# Patient Record
Sex: FEMALE | Race: WHITE | NOT HISPANIC OR LATINO | Employment: UNEMPLOYED | URBAN - METROPOLITAN AREA
[De-identification: names, ages, dates, MRNs, and addresses within clinical notes are randomized per-mention and may not be internally consistent; named-entity substitution may affect disease eponyms.]

---

## 2023-03-02 LAB
FLU A RESULT: NOT DETECTED
FLU B RESULT: NOT DETECTED
SARS-COV-2 RESULT: NOT DETECTED

## 2023-03-20 DIAGNOSIS — L22 DIAPER RASH: ICD-10-CM

## 2023-03-20 DIAGNOSIS — J45.40 MODERATE PERSISTENT ASTHMA WITHOUT COMPLICATION (HHS-HCC): Primary | ICD-10-CM

## 2023-03-20 PROBLEM — K21.9 GERD (GASTROESOPHAGEAL REFLUX DISEASE): Status: ACTIVE | Noted: 2023-03-20

## 2023-03-20 PROBLEM — R45.4 IRRITABLE: Status: ACTIVE | Noted: 2023-03-20

## 2023-03-20 PROBLEM — R32 INCONTINENCE: Status: ACTIVE | Noted: 2023-03-20

## 2023-03-20 PROBLEM — D84.9 IMMUNODEFICIENCY DISORDER (MULTI): Status: ACTIVE | Noted: 2023-03-20

## 2023-03-20 PROBLEM — K59.1 FUNCTIONAL DIARRHEA: Status: ACTIVE | Noted: 2023-03-20

## 2023-03-20 PROBLEM — J45.901 EXTRINSIC ASTHMA, WITH ACUTE EXACERBATION (HHS-HCC): Status: ACTIVE | Noted: 2023-03-20

## 2023-03-20 PROBLEM — J30.9 ALLERGIC RHINITIS: Status: ACTIVE | Noted: 2023-03-20

## 2023-03-20 PROBLEM — K59.00 CONSTIPATION, ACUTE: Status: ACTIVE | Noted: 2023-03-20

## 2023-03-20 PROBLEM — E16.2 HYPOGLYCEMIA: Status: ACTIVE | Noted: 2023-03-20

## 2023-03-20 PROBLEM — R23.3 PETECHIAE: Status: ACTIVE | Noted: 2023-03-20

## 2023-03-20 PROBLEM — F41.1 ANXIETY, GENERALIZED: Status: ACTIVE | Noted: 2023-03-20

## 2023-03-20 PROBLEM — Z93.1 G TUBE FEEDINGS (MULTI): Status: ACTIVE | Noted: 2023-03-20

## 2023-03-20 PROBLEM — N91.2 AMENORRHEA: Status: ACTIVE | Noted: 2023-03-20

## 2023-03-20 PROBLEM — Q68.8 CONGENITAL GENERALIZED FLEXION CONTRACTURES OF LOWER LIMB JOINTS: Status: ACTIVE | Noted: 2023-03-20

## 2023-03-20 PROBLEM — N39.0 URINARY TRACT INFECTION: Status: ACTIVE | Noted: 2023-03-20

## 2023-03-20 PROBLEM — H02.409 PTOSIS: Status: ACTIVE | Noted: 2023-03-20

## 2023-03-20 PROBLEM — Q87.19 CORNELIA DE LANGE SYNDROME (HHS-HCC): Status: ACTIVE | Noted: 2023-03-20

## 2023-03-20 PROBLEM — R14.0 ABDOMINAL BLOATING: Status: ACTIVE | Noted: 2023-03-20

## 2023-03-20 RX ORDER — SODIUM CHLORIDE FOR INHALATION 0.9 %
VIAL, NEBULIZER (ML) INHALATION
COMMUNITY
Start: 2017-02-07 | End: 2023-03-27 | Stop reason: SDUPTHER

## 2023-03-20 RX ORDER — DOCUSATE SODIUM 100 MG
CAPSULE ORAL 2 TIMES DAILY
COMMUNITY
Start: 2021-03-30

## 2023-03-20 RX ORDER — FLUTICASONE PROPIONATE 50 MCG
1 SPRAY, SUSPENSION (ML) NASAL DAILY
COMMUNITY
End: 2023-03-20 | Stop reason: SDUPTHER

## 2023-03-20 RX ORDER — ESOMEPRAZOLE MAGNESIUM 40 MG/1
GRANULE, DELAYED RELEASE ORAL
COMMUNITY
Start: 2022-05-31

## 2023-03-20 RX ORDER — POLYETHYLENE GLYCOL 3350 17 G/17G
POWDER, FOR SOLUTION ORAL
COMMUNITY
Start: 2020-06-18

## 2023-03-20 RX ORDER — NITROFURANTOIN MACROCRYSTALS 25 MG/1
CAPSULE ORAL
COMMUNITY
Start: 2022-06-30

## 2023-03-20 RX ORDER — TRIPROLIDINE/PSEUDOEPHEDRINE 2.5MG-60MG
TABLET ORAL EVERY 8 HOURS
COMMUNITY
Start: 2015-02-15

## 2023-03-20 RX ORDER — IPRATROPIUM BROMIDE 0.5 MG/2.5ML
SOLUTION RESPIRATORY (INHALATION) EVERY 8 HOURS
COMMUNITY
Start: 2021-01-04

## 2023-03-20 RX ORDER — DEXTROMETHORPHAN/PSEUDOEPHED 2.5-7.5/.8
DROPS ORAL
COMMUNITY
Start: 2019-11-25 | End: 2023-07-10 | Stop reason: SDUPTHER

## 2023-03-20 RX ORDER — ALBUTEROL SULFATE 0.83 MG/ML
SOLUTION RESPIRATORY (INHALATION)
COMMUNITY
Start: 2015-05-18 | End: 2024-02-02 | Stop reason: SDUPTHER

## 2023-03-20 RX ORDER — MUPIROCIN 20 MG/G
OINTMENT TOPICAL 3 TIMES DAILY
COMMUNITY
Start: 2018-10-02 | End: 2023-03-23 | Stop reason: SDUPTHER

## 2023-03-20 RX ORDER — IPRATROPIUM BROMIDE 21 UG/1
SPRAY, METERED NASAL
COMMUNITY
Start: 2017-11-14 | End: 2023-10-03 | Stop reason: SDUPTHER

## 2023-03-20 RX ORDER — LACTOBACILLUS RHAMNOSUS GG 10B CELL
CAPSULE ORAL
COMMUNITY

## 2023-03-20 RX ORDER — ALBUTEROL SULFATE 90 UG/1
AEROSOL, METERED RESPIRATORY (INHALATION)
COMMUNITY
Start: 2021-04-13

## 2023-03-20 RX ORDER — CHOLESTYRAMINE 4 G/9G
POWDER, FOR SUSPENSION ORAL
COMMUNITY
Start: 2017-01-20

## 2023-03-20 RX ORDER — SERTRALINE HYDROCHLORIDE 20 MG/ML
SOLUTION ORAL
COMMUNITY
End: 2023-05-04 | Stop reason: SDUPTHER

## 2023-03-21 RX ORDER — FLUTICASONE PROPIONATE 50 MCG
1 SPRAY, SUSPENSION (ML) NASAL DAILY
Qty: 16 G | Refills: 3 | Status: SHIPPED | OUTPATIENT
Start: 2023-03-21 | End: 2023-12-08 | Stop reason: SDUPTHER

## 2023-03-23 RX ORDER — MUPIROCIN 20 MG/G
OINTMENT TOPICAL 3 TIMES DAILY
Qty: 70 G | Refills: 11 | Status: SHIPPED | OUTPATIENT
Start: 2023-03-23 | End: 2023-06-21

## 2023-03-23 RX ORDER — NYSTATIN 100000 U/G
CREAM TOPICAL
COMMUNITY
Start: 2023-03-06 | End: 2023-03-23 | Stop reason: SDUPTHER

## 2023-03-23 RX ORDER — NYSTATIN 100000 U/G
CREAM TOPICAL 3 TIMES DAILY PRN
Qty: 90 G | Refills: 3 | Status: SHIPPED | OUTPATIENT
Start: 2023-03-23 | End: 2023-06-21

## 2023-03-27 DIAGNOSIS — Q30.0 CHOANAL ATRESIA: Primary | ICD-10-CM

## 2023-03-27 RX ORDER — SODIUM CHLORIDE FOR INHALATION 0.9 %
3 VIAL, NEBULIZER (ML) INHALATION AS NEEDED
Qty: 90 ML | Refills: 1 | Status: SHIPPED | OUTPATIENT
Start: 2023-03-27 | End: 2023-06-25

## 2023-05-04 DIAGNOSIS — F41.9 ANXIETY: Primary | ICD-10-CM

## 2023-05-04 RX ORDER — SERTRALINE HYDROCHLORIDE 20 MG/ML
SOLUTION ORAL
Qty: 90 ML | Refills: 0 | Status: SHIPPED | OUTPATIENT
Start: 2023-05-04 | End: 2023-07-18 | Stop reason: SDUPTHER

## 2023-07-07 ENCOUNTER — TELEPHONE (OUTPATIENT)
Dept: PEDIATRICS | Facility: CLINIC | Age: 23
End: 2023-07-07
Payer: COMMERCIAL

## 2023-07-07 NOTE — TELEPHONE ENCOUNTER
Patient's mom is requesting a refill on Simethicone to be sent to [facility] on [address] In [place]. She is aware you are out of office until Monday and is okay to wait.

## 2023-07-10 DIAGNOSIS — R14.0 GASSINESS: Primary | ICD-10-CM

## 2023-07-10 DIAGNOSIS — R14.0 GASSINESS: ICD-10-CM

## 2023-07-10 RX ORDER — DEXTROMETHORPHAN/PSEUDOEPHED 2.5-7.5/.8
40 DROPS ORAL 4 TIMES DAILY PRN
Qty: 30 ML | Refills: 11 | Status: SHIPPED | OUTPATIENT
Start: 2023-07-10 | End: 2023-07-10 | Stop reason: SDUPTHER

## 2023-07-10 RX ORDER — DEXTROMETHORPHAN/PSEUDOEPHED 2.5-7.5/.8
DROPS ORAL
Qty: 720 ML | Refills: 2 | Status: SHIPPED | OUTPATIENT
Start: 2023-07-10

## 2023-07-11 ENCOUNTER — OFFICE VISIT (OUTPATIENT)
Dept: PEDIATRICS | Facility: CLINIC | Age: 23
End: 2023-07-11
Payer: COMMERCIAL

## 2023-07-11 ENCOUNTER — TELEPHONE (OUTPATIENT)
Dept: PEDIATRICS | Facility: CLINIC | Age: 23
End: 2023-07-11

## 2023-07-11 VITALS — BODY MASS INDEX: 16.7 KG/M2 | WEIGHT: 38 LBS

## 2023-07-11 DIAGNOSIS — R26.89 NOT BEARING WEIGHT ON LOWER EXTREMITY: Primary | ICD-10-CM

## 2023-07-11 PROBLEM — Z93.1 GASTROSTOMY PRESENT (MULTI): Status: ACTIVE | Noted: 2018-10-17

## 2023-07-11 PROBLEM — E30.0 DELAY IN SEXUAL DEVELOPMENT AND PUBERTY: Status: ACTIVE | Noted: 2022-12-06

## 2023-07-11 PROBLEM — F79 INTELLECTUAL DISABILITY: Chronic | Status: ACTIVE | Noted: 2017-10-31

## 2023-07-11 PROBLEM — J45.50 SEVERE PERSISTENT ASTHMA WITHOUT COMPLICATION (MULTI): Status: ACTIVE | Noted: 2017-06-29

## 2023-07-11 PROBLEM — Z86.14 PERSONAL HISTORY OF METHICILLIN RESISTANT STAPHYLOCOCCUS AUREUS INFECTION: Status: ACTIVE | Noted: 2020-11-26

## 2023-07-11 PROBLEM — E27.49 IATROGENIC ADRENAL INSUFFICIENCY (MULTI): Status: ACTIVE | Noted: 2022-12-06

## 2023-07-11 PROBLEM — M91.90: Status: ACTIVE | Noted: 2020-11-26

## 2023-07-11 PROBLEM — R06.89 AIRWAY CLEARANCE IMPAIRMENT: Chronic | Status: ACTIVE | Noted: 2018-06-26

## 2023-07-11 PROBLEM — E22.1 HYPERPROLACTINEMIA (MULTI): Status: ACTIVE | Noted: 2022-12-06

## 2023-07-11 PROBLEM — Q30.0 CONGENITAL CHOANAL ATRESIA: Status: ACTIVE | Noted: 2017-10-31

## 2023-07-11 PROBLEM — D73.4 SPLENIC CYST: Status: RESOLVED | Noted: 2021-05-13 | Resolved: 2023-07-11

## 2023-07-11 PROBLEM — R13.12 DYSPHAGIA, OROPHARYNGEAL: Chronic | Status: ACTIVE | Noted: 2017-10-31

## 2023-07-11 PROBLEM — K21.9 GASTROESOPHAGEAL REFLUX DISEASE: Status: ACTIVE | Noted: 2017-10-31

## 2023-07-11 PROBLEM — E23.7: Status: ACTIVE | Noted: 2022-12-06

## 2023-07-11 PROBLEM — N13.70 VUR (VESICOURETERIC REFLUX): Chronic | Status: ACTIVE | Noted: 2017-10-31

## 2023-07-11 PROBLEM — K02.9 DENTAL CARIES: Status: ACTIVE | Noted: 2017-09-22

## 2023-07-11 PROBLEM — Z98.890 STATUS POST CREATION OF PERICARDIAL WINDOW: Status: RESOLVED | Noted: 2021-03-16 | Resolved: 2023-07-11

## 2023-07-11 PROCEDURE — 99213 OFFICE O/P EST LOW 20 MIN: CPT | Performed by: PEDIATRICS

## 2023-07-11 NOTE — PATIENT INSTRUCTIONS
Assessment/Plan   Diagnoses and all orders for this visit:  Not bearing weight on lower extremity  -     XR femur right 2+ views; Future  -     XR tibia fibula right 2 views; Future  Other orders  -     XR pelvis 1-2 views   RED yeast Rice

## 2023-07-11 NOTE — PROGRESS NOTES
Subjective   Patient ID: Audra Appiah is a 22 y.o. female who presents for Foot Injury.  Foot Injury       Audra is here today with mom and aide.  Since yesterday she has been refusing weightbearing.  This seems to be on her right leg.  She had numerous different things going on this weekend.  But she started nonweightbearing yesterday.  Review of Systems   All other systems reviewed and are negative.      Objective   .vitals    Physical Exam  Physical done by mom  FROM of right leg no tenderness to palpation or manipulation.  Abd :soft ? Tender over left pelvis but not reproducible  Assessment/Plan   Diagnoses and all orders for this visit:  Not bearing weight on lower extremity  -     XR femur right 2+ views; Future  -     XR tibia fibula right 2 views; Future  Other orders  -     XR pelvis 1-2 views    I will call with results  Diann Chávez MD

## 2023-07-12 DIAGNOSIS — R26.89 NOT BEARING WEIGHT ON LOWER EXTREMITY: ICD-10-CM

## 2023-07-18 DIAGNOSIS — F41.9 ANXIETY: ICD-10-CM

## 2023-07-18 RX ORDER — SERTRALINE HYDROCHLORIDE 20 MG/ML
SOLUTION ORAL
Qty: 90 ML | Refills: 0 | Status: SHIPPED | OUTPATIENT
Start: 2023-07-18 | End: 2023-10-03 | Stop reason: SDUPTHER

## 2023-09-05 ENCOUNTER — TELEPHONE (OUTPATIENT)
Dept: PEDIATRICS | Facility: CLINIC | Age: 23
End: 2023-09-05
Payer: COMMERCIAL

## 2023-10-02 DIAGNOSIS — J30.89 ALLERGIC RHINITIS DUE TO OTHER ALLERGIC TRIGGER, UNSPECIFIED SEASONALITY: ICD-10-CM

## 2023-10-03 DIAGNOSIS — R09.81 CHRONIC NASAL CONGESTION: Primary | ICD-10-CM

## 2023-10-03 DIAGNOSIS — F41.9 ANXIETY: ICD-10-CM

## 2023-10-03 RX ORDER — IPRATROPIUM BROMIDE 21 UG/1
2 SPRAY, METERED NASAL 2 TIMES DAILY
Qty: 72 ML | Refills: 0 | Status: SHIPPED | OUTPATIENT
Start: 2023-10-03 | End: 2023-12-08 | Stop reason: SDUPTHER

## 2023-10-03 RX ORDER — SERTRALINE HYDROCHLORIDE 20 MG/ML
SOLUTION ORAL
Qty: 90 ML | Refills: 0 | Status: SHIPPED | OUTPATIENT
Start: 2023-10-03

## 2023-10-03 RX ORDER — IPRATROPIUM BROMIDE 21 UG/1
2 SPRAY, METERED NASAL 2 TIMES DAILY
Status: SHIPPED | OUTPATIENT
Start: 2023-10-03

## 2023-10-19 ENCOUNTER — CLINICAL SUPPORT (OUTPATIENT)
Dept: PEDIATRICS | Facility: CLINIC | Age: 23
End: 2023-10-19
Payer: COMMERCIAL

## 2023-10-19 DIAGNOSIS — Z23 ENCOUNTER FOR IMMUNIZATION: Primary | ICD-10-CM

## 2023-10-19 PROCEDURE — 90686 IIV4 VACC NO PRSV 0.5 ML IM: CPT | Performed by: PEDIATRICS

## 2023-10-19 PROCEDURE — 90471 IMMUNIZATION ADMIN: CPT | Performed by: PEDIATRICS

## 2023-10-25 ENCOUNTER — ANCILLARY PROCEDURE (OUTPATIENT)
Dept: RADIOLOGY | Facility: CLINIC | Age: 23
End: 2023-10-25
Payer: COMMERCIAL

## 2023-10-25 ENCOUNTER — OFFICE VISIT (OUTPATIENT)
Dept: PEDIATRICS | Facility: CLINIC | Age: 23
End: 2023-10-25
Payer: COMMERCIAL

## 2023-10-25 VITALS — WEIGHT: 38 LBS | TEMPERATURE: 98.2 F | BODY MASS INDEX: 16.7 KG/M2

## 2023-10-25 DIAGNOSIS — J18.9 PNEUMONIA OF BOTH LUNGS DUE TO INFECTIOUS ORGANISM, UNSPECIFIED PART OF LUNG: Primary | ICD-10-CM

## 2023-10-25 DIAGNOSIS — J45.41 MODERATE PERSISTENT EXTRINSIC ASTHMA WITH ACUTE EXACERBATION (HHS-HCC): Primary | ICD-10-CM

## 2023-10-25 DIAGNOSIS — J45.41 MODERATE PERSISTENT EXTRINSIC ASTHMA WITH ACUTE EXACERBATION (HHS-HCC): ICD-10-CM

## 2023-10-25 PROCEDURE — 1036F TOBACCO NON-USER: CPT | Performed by: PEDIATRICS

## 2023-10-25 PROCEDURE — 71046 X-RAY EXAM CHEST 2 VIEWS: CPT

## 2023-10-25 PROCEDURE — 87636 SARSCOV2 & INF A&B AMP PRB: CPT

## 2023-10-25 PROCEDURE — 71046 X-RAY EXAM CHEST 2 VIEWS: CPT | Performed by: RADIOLOGY

## 2023-10-25 PROCEDURE — 99215 OFFICE O/P EST HI 40 MIN: CPT | Performed by: PEDIATRICS

## 2023-10-25 RX ORDER — CIPROFLOXACIN 250 MG/1
250 TABLET, FILM COATED ORAL 2 TIMES DAILY
Qty: 20 TABLET | Refills: 0 | Status: CANCELLED | OUTPATIENT
Start: 2023-10-25 | End: 2023-11-04

## 2023-10-25 NOTE — PATIENT INSTRUCTIONS
Audra presents for a sick visit. Medication and allergy histories were reviewed. She woke up this morning with moaning., irritability, retracting and restlessness. Mom put her on oxygen and she felt much more comfortable. She receives Albuterol. She underwent a chest x-ray today which came back concerning for pneumonia.    The patient previously experienced this in May 2023.    Dad was sick 3 days ago with cough and post-nasal drip. He did not test for COVID.    I prescribed a COVID PCR test and Influenza PCR test.    I discussed the case with Dr. Blanco who is concerned for rapid decompensation. It was decide that the patient will get admitted to [facility].

## 2023-10-25 NOTE — PROGRESS NOTES
Subjective   Patient ID: Audra Appiah is a 23 y.o. female, otherwise healthy, who presents for a sick visit.  She is accompanied today by her parents.    HPI  Audra Appiah is a 23 y.o. female presenting for a sick visit, accompanied by her parents. Medication and allergy histories were reviewed. She woke up this morning with moaning., irritability, retracting and restlessness. Mom put her on oxygen and she felt much more comfortable. She receives Albuterol. She underwent a chest x-ray today which came back concerning for pneumonia.    The patient previously experienced this in May 2023.    Dad was sick 3 days ago with cough and post-nasal drip. He did not test for COVID.    Review of Systems  The following history was obtained from parents.   Constitutional: Positive moaning. Otherwise denies fever, chills. No difficulties with sleeping, eating, drinking, urine output, or bowel movements.    Eyes, ENT: Denies eye complaints, ear complaints, nasal congestion, runny nose, or sore throat.   Cardio/Resp: Positive retracting. Denies chest pain, palpitations, shortness of breath, wheezing, cough, working hard to breathe, or breathing fast.   GI/Renal: Denies nausea, vomiting, stomachache, diarrhea, or constipation. Denies dysuria or abnormal urine color or smell.   Musculoskeletal/Skin: Denies muscle or joint complaints. Denies skin rash.   Neuro/Psych: Positive irritability, restlessness. Denies headache, dizziness, confusion.  Endo/heme/lymph: Denies excessive thirst, excessive sweating, bruising, bleeding, or swollen glands.     Objective   Temp 36.8 °C (98.2 °F)   Wt (!) 17.2 kg (38 lb)   BMI 16.70 kg/m²  Pox= 96% on room air, canula removed.  Pulse 135 bpm  BSA: 0.7 meters squared  Growth percentiles: Facility age limit for growth %hector is 20 years. Facility age limit for growth %hector is 20 years.     Physical Exam  Constitutional: Well developed, well nourished, well hydrated and no acute distress.  Head and Face:  Normocephalic, atraumatic.  Inspection and palpation of the face: Normal.  Eyes: Conjunctiva and lids normal.  Ears, Nose, Mouth, and Throat: Cooperative with exam, which is very unlike the patient. TM's clear.  Neck: No significant cervical adenopathy. Thyroid not enlarged.  Pulmonary: Upper airway coarseness. Supraclavicular retractions.  Cardiovascular: Regular rate and rhythm. No significant murmur.  Chest: Normal without deformity.  Abdomen: Soft, non-tender, no masses. No hepatomegaly or splenomegaly.   Skin: No significant rash or lesions.    Problem List Items Addressed This Visit    None  Visit Diagnoses         Codes    Pneumonia of both lungs due to infectious organism, unspecified part of lung    -  Primary J18.9    Relevant Orders    Influenza A, and B PCR    Sars-CoV-2 PCR, Symptomatic          Time in: 4:45 pm  Time done: 5:41 pm    Assessment/Plan    Audra presents for a sick visit. Medication and allergy histories were reviewed. She woke up this morning with moaning., irritability, retracting and restlessness. Mom put her on oxygen and she felt much more comfortable. She receives Albuterol. She underwent a chest x-ray today which came back concerning for pneumonia.    The patient previously experienced this in May 2023.    Dad was sick 3 days ago with cough and post-nasal drip. He did not test for COVID.    I prescribed a COVID PCR test and Influenza PCR test.    I discussed the case with Dr. Blanco who is concerned for rapid decompensation. It was decide that the patient will get admitted to [facility].    Scribe Attestation  By signing my name below, I, Mandi Arevalo, attest that this documentation has been prepared under the direction and in the presence of Dr. Tami Rosenberg.    Provider Attestation - Scribe documentation  All medical record entries made by the Scribe were at my direction and personally dictated by me. I have reviewed the chart and agree that the record accurately  reflects my personal performance of the history, physical exam, discussion and plan.

## 2023-10-26 LAB
FLUAV RNA RESP QL NAA+PROBE: NOT DETECTED
FLUBV RNA RESP QL NAA+PROBE: NOT DETECTED
SARS-COV-2 RNA RESP QL NAA+PROBE: NOT DETECTED

## 2023-12-08 DIAGNOSIS — J45.40 MODERATE PERSISTENT ASTHMA WITHOUT COMPLICATION (HHS-HCC): ICD-10-CM

## 2023-12-08 DIAGNOSIS — R09.81 CHRONIC NASAL CONGESTION: ICD-10-CM

## 2023-12-08 RX ORDER — FLUTICASONE PROPIONATE 50 MCG
1 SPRAY, SUSPENSION (ML) NASAL DAILY
Qty: 16 G | Refills: 3 | Status: SHIPPED | OUTPATIENT
Start: 2023-12-08 | End: 2024-03-07

## 2023-12-08 RX ORDER — IPRATROPIUM BROMIDE 21 UG/1
2 SPRAY, METERED NASAL 2 TIMES DAILY
Qty: 72 ML | Refills: 0 | Status: SHIPPED | OUTPATIENT
Start: 2023-12-08 | End: 2024-03-07

## 2024-01-03 ENCOUNTER — CLINICAL SUPPORT (OUTPATIENT)
Dept: PEDIATRICS | Facility: CLINIC | Age: 24
End: 2024-01-03
Payer: COMMERCIAL

## 2024-01-03 DIAGNOSIS — Z23 ENCOUNTER FOR IMMUNIZATION: ICD-10-CM

## 2024-01-03 PROCEDURE — 90480 ADMN SARSCOV2 VAC 1/ONLY CMP: CPT | Performed by: PEDIATRICS

## 2024-01-03 PROCEDURE — 91322 SARSCOV2 VAC 50 MCG/0.5ML IM: CPT | Performed by: PEDIATRICS

## 2024-01-09 ENCOUNTER — LAB (OUTPATIENT)
Dept: LAB | Facility: LAB | Age: 24
End: 2024-01-09
Payer: COMMERCIAL

## 2024-01-09 DIAGNOSIS — D80.1 NONFAMILIAL HYPOGAMMAGLOBULINEMIA (MULTI): Primary | ICD-10-CM

## 2024-01-09 PROCEDURE — 82784 ASSAY IGA/IGD/IGG/IGM EACH: CPT

## 2024-01-09 PROCEDURE — 36415 COLL VENOUS BLD VENIPUNCTURE: CPT

## 2024-01-10 LAB
IGA SERPL-MCNC: 250 MG/DL (ref 70–400)
IGG SERPL-MCNC: 1460 MG/DL (ref 700–1600)
IGM SERPL-MCNC: 74 MG/DL (ref 40–230)

## 2024-01-22 ENCOUNTER — TELEPHONE (OUTPATIENT)
Dept: PEDIATRICS | Facility: CLINIC | Age: 24
End: 2024-01-22
Payer: COMMERCIAL

## 2024-01-22 DIAGNOSIS — F41.9 ANXIETY: Primary | ICD-10-CM

## 2024-01-22 RX ORDER — SERTRALINE HYDROCHLORIDE 20 MG/ML
SOLUTION ORAL
Qty: 55 ML | Refills: 0 | Status: SHIPPED | OUTPATIENT
Start: 2024-01-22 | End: 2024-02-28 | Stop reason: SDUPTHER

## 2024-02-02 DIAGNOSIS — J45.50 SEVERE PERSISTENT ASTHMA WITHOUT COMPLICATION (MULTI): ICD-10-CM

## 2024-02-02 DIAGNOSIS — R06.89 AIRWAY CLEARANCE IMPAIRMENT: Chronic | ICD-10-CM

## 2024-02-02 RX ORDER — ALBUTEROL SULFATE 0.83 MG/ML
2.5 SOLUTION RESPIRATORY (INHALATION) EVERY 6 HOURS PRN
Qty: 75 ML | Refills: 3 | Status: SHIPPED | OUTPATIENT
Start: 2024-02-02 | End: 2024-03-03

## 2024-02-28 DIAGNOSIS — F41.9 ANXIETY: ICD-10-CM

## 2024-02-28 RX ORDER — SERTRALINE HYDROCHLORIDE 20 MG/ML
30 SOLUTION ORAL DAILY
Qty: 45 ML | Refills: 2 | Status: SHIPPED | OUTPATIENT
Start: 2024-02-28 | End: 2024-05-06 | Stop reason: SDUPTHER

## 2024-05-06 DIAGNOSIS — F41.9 ANXIETY: ICD-10-CM

## 2024-05-06 RX ORDER — SERTRALINE HYDROCHLORIDE 20 MG/ML
30 SOLUTION ORAL DAILY
Qty: 135 ML | Refills: 0 | Status: SHIPPED | OUTPATIENT
Start: 2024-05-06 | End: 2024-08-04

## 2024-06-24 DIAGNOSIS — K59.1 FUNCTIONAL DIARRHEA: ICD-10-CM

## 2024-06-24 DIAGNOSIS — Z93.1 GASTROSTOMY PRESENT (MULTI): ICD-10-CM

## 2024-06-24 DIAGNOSIS — D84.9 IMMUNODEFICIENCY DISORDER (MULTI): ICD-10-CM

## 2024-06-24 DIAGNOSIS — E16.2 HYPOGLYCEMIA: ICD-10-CM

## 2024-06-24 DIAGNOSIS — J45.51: ICD-10-CM

## 2024-06-24 DIAGNOSIS — Q87.19 CORNELIA DE LANGE SYNDROME (HHS-HCC): ICD-10-CM

## 2024-06-24 DIAGNOSIS — R32 URINARY INCONTINENCE, UNSPECIFIED TYPE: ICD-10-CM

## 2024-08-01 DIAGNOSIS — Z43.1 ATTENTION TO G-TUBE (MULTI): ICD-10-CM

## 2024-08-01 DIAGNOSIS — F41.9 ANXIETY: Primary | ICD-10-CM

## 2024-08-01 RX ORDER — SERTRALINE HYDROCHLORIDE 20 MG/ML
30 SOLUTION ORAL DAILY
Qty: 135 ML | Refills: 0 | Status: SHIPPED | OUTPATIENT
Start: 2024-08-01 | End: 2024-10-30

## 2024-08-01 RX ORDER — GAUZE BANDAGE 2" X 2"
1 SPONGE TOPICAL 3 TIMES DAILY
Qty: 200 EACH | Refills: 3 | Status: SHIPPED | OUTPATIENT
Start: 2024-08-01 | End: 2024-08-31